# Patient Record
Sex: FEMALE | Race: WHITE | NOT HISPANIC OR LATINO | Employment: OTHER | URBAN - METROPOLITAN AREA
[De-identification: names, ages, dates, MRNs, and addresses within clinical notes are randomized per-mention and may not be internally consistent; named-entity substitution may affect disease eponyms.]

---

## 2022-05-28 RX ORDER — CARVEDILOL 6.25 MG/1
6.25 TABLET ORAL 2 TIMES DAILY
COMMUNITY
Start: 2021-09-30 | End: 2023-12-07

## 2022-05-28 RX ORDER — CALCIUM CARBONATE/VITAMIN D3 600MG-5MCG
1 TABLET ORAL DAILY
COMMUNITY
End: 2023-11-06 | Stop reason: ALTCHOICE

## 2022-05-28 RX ORDER — ATORVASTATIN CALCIUM 20 MG/1
20 TABLET, FILM COATED ORAL DAILY
COMMUNITY

## 2022-05-28 RX ORDER — POTASSIUM CHLORIDE 750 MG/1
10 TABLET, EXTENDED RELEASE ORAL 2 TIMES DAILY
COMMUNITY
Start: 2021-09-08 | End: 2023-11-06 | Stop reason: ALTCHOICE

## 2022-05-28 RX ORDER — NITROGLYCERIN 0.4 MG/1
0.4 TABLET SUBLINGUAL EVERY 5 MIN PRN
COMMUNITY
Start: 2021-07-12

## 2022-05-28 RX ORDER — METAXALONE 400 MG/1
1 TABLET ORAL 2 TIMES DAILY
COMMUNITY

## 2022-05-28 RX ORDER — FUROSEMIDE 20 MG/1
20 TABLET ORAL
COMMUNITY
Start: 2021-09-08

## 2022-05-28 RX ORDER — ACETAMINOPHEN 500 MG
5000 TABLET ORAL DAILY
COMMUNITY
End: 2023-11-06 | Stop reason: ALTCHOICE

## 2022-05-28 RX ORDER — HYDRALAZINE HYDROCHLORIDE 50 MG/1
50 TABLET, FILM COATED ORAL 2 TIMES DAILY
COMMUNITY

## 2022-05-28 RX ORDER — AMLODIPINE BESYLATE 5 MG/1
5 TABLET ORAL DAILY
COMMUNITY
Start: 2022-04-14 | End: 2023-12-07

## 2022-05-28 RX ORDER — SERTRALINE HYDROCHLORIDE 25 MG/1
25 TABLET, FILM COATED ORAL
COMMUNITY

## 2022-05-28 RX ORDER — TRAMADOL HYDROCHLORIDE 50 MG/1
50 TABLET ORAL EVERY 6 HOURS PRN
COMMUNITY

## 2022-05-28 RX ORDER — RANOLAZINE 500 MG/1
500 TABLET, EXTENDED RELEASE ORAL 2 TIMES DAILY
COMMUNITY
Start: 2022-04-14

## 2022-05-28 RX ORDER — VALSARTAN 160 MG/1
160 TABLET ORAL 2 TIMES DAILY
COMMUNITY

## 2022-05-28 RX ORDER — FERROUS GLUCONATE 324(38)MG
324 TABLET ORAL DAILY
COMMUNITY
Start: 2021-08-23 | End: 2022-06-01

## 2022-05-28 RX ORDER — ASPIRIN 81 MG/1
81 TABLET ORAL DAILY
COMMUNITY

## 2022-05-28 RX ORDER — METFORMIN HYDROCHLORIDE 1000 MG/1
1000 TABLET ORAL 2 TIMES DAILY WITH MEALS
COMMUNITY

## 2022-05-28 RX ORDER — CLOPIDOGREL BISULFATE 75 MG/1
75 TABLET ORAL DAILY
COMMUNITY

## 2022-05-28 RX ORDER — ONDANSETRON 4 MG/1
4 TABLET, FILM COATED ORAL EVERY 8 HOURS PRN
COMMUNITY

## 2022-06-01 ENCOUNTER — OFFICE VISIT (OUTPATIENT)
Dept: NEPHROLOGY | Facility: CLINIC | Age: 82
End: 2022-06-01
Payer: MEDICARE

## 2022-06-01 VITALS
HEART RATE: 66 BPM | HEIGHT: 66 IN | TEMPERATURE: 98 F | BODY MASS INDEX: 20.54 KG/M2 | OXYGEN SATURATION: 98 % | SYSTOLIC BLOOD PRESSURE: 129 MMHG | WEIGHT: 127.81 LBS | DIASTOLIC BLOOD PRESSURE: 49 MMHG

## 2022-06-01 DIAGNOSIS — I10 PRIMARY HYPERTENSION: ICD-10-CM

## 2022-06-01 DIAGNOSIS — E87.1 HYPONATREMIA: ICD-10-CM

## 2022-06-01 DIAGNOSIS — E11.22 TYPE 2 DIABETES MELLITUS WITH STAGE 3 CHRONIC KIDNEY DISEASE, UNSPECIFIED WHETHER LONG TERM INSULIN USE, UNSPECIFIED WHETHER STAGE 3A OR 3B CKD: ICD-10-CM

## 2022-06-01 DIAGNOSIS — N18.30 STAGE 3 CHRONIC KIDNEY DISEASE, UNSPECIFIED WHETHER STAGE 3A OR 3B CKD: Primary | ICD-10-CM

## 2022-06-01 DIAGNOSIS — N18.30 TYPE 2 DIABETES MELLITUS WITH STAGE 3 CHRONIC KIDNEY DISEASE, UNSPECIFIED WHETHER LONG TERM INSULIN USE, UNSPECIFIED WHETHER STAGE 3A OR 3B CKD: ICD-10-CM

## 2022-06-01 PROCEDURE — 99213 OFFICE O/P EST LOW 20 MIN: CPT | Mod: ,,, | Performed by: INTERNAL MEDICINE

## 2022-06-01 PROCEDURE — 99213 PR OFFICE/OUTPT VISIT, EST, LEVL III, 20-29 MIN: ICD-10-PCS | Mod: ,,, | Performed by: INTERNAL MEDICINE

## 2022-06-01 NOTE — PROGRESS NOTES
Nephrology Clinic Note           Pt Name:  Shaunna Franklin  Pt :  1940  Pt MRN:  [id number]    Date: 2022  Provider: Modesto Harmon      Chief Complaint: No chief complaint on file.      HPI:  Shaunna Franklin is a 82 y.o. 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  History is significant for chf, hyponatremia, htn . The patient is coming for follow up.  Since her last visit no new medical events. Intermittently takes Lasix intermittently  Hyponatremia has been for a long time - no new issues,stable at present  Today in the office - No shortness of breath, no chest pain, no fever, no dysuria, no hematuria,  Reports good control of her BS at home.  Bp well control.     Review of Systems         Review of Systems   Gen: no fever, chills, fatigue, weight loss/gain  HEENT: no vision changes, no hearing deficits, no nasal discharge  Respiratory: no cough, dyspnea  CVS: no chest pain, PND, orthopnea  Abd: no nausea, vomiting, abdominal pain, diarrhea, constipation  : no hematuria, dysuria, urinary retention  Ext: no edema, joint and muscle pains  Neuro: no weakness, no numbness  Skin: no rashes, no lesions  Psych: no depression, no anxiety    History:   Past Medical History:   Diagnosis Date    Aortic valve sclerosis     CAD (coronary artery disease) 2012    stent RCA    Cancer     skin cancer    CKD (chronic kidney disease)     Diabetes mellitus     Essential (primary) hypertension     Gastritis     Internal hemorrhoid     Mixed hyperlipidemia     Unspecified osteoarthritis, unspecified site      Past Surgical History:   Procedure Laterality Date    ABDOMINAL SURGERY      ABDOMINOPLASTY      CARDIAC CATHETERIZATION      CATARACT EXTRACTION Bilateral     COLONOSCOPY      COSMETIC SURGERY      bilateral under arm fat removal    ESOPHAGOGASTRODUODENOSCOPY      EYE SURGERY      FRACTURE SURGERY      HIP SURGERY      right sided - pinning and screws with rods    HYSTERECTOMY       ROTATOR CUFF REPAIR Bilateral     open repair    TUMOR REMOVAL      stomach    VARICOSE VEIN SURGERY       Family History   Problem Relation Age of Onset    Hypertension Mother     Heart disease Father     Cancer Sister     Diabetes Mellitus Maternal Grandmother     Heart disease Son      Social History     Substance and Sexual Activity   Alcohol Use Not on file     Social History     Substance and Sexual Activity   Drug Use Not on file     Social History     Substance and Sexual Activity   Sexual Activity Not on file     has no history on file for sexual activity.  Social History     Tobacco Use   Smoking Status Not on file   Smokeless Tobacco Not on file       Allergies:  Review of patient's allergies indicates:   Allergen Reactions    Opioids - morphine analogues        [unfilled]       Physical Exam  Vitals:   There were no vitals filed for this visit.  There is no height or weight on file to calculate BMI.    Vital signs:   Wt Readings from Last 3 Encounters:   No data found for Wt     Temp Readings from Last 3 Encounters:   No data found for Temp     BP Readings from Last 3 Encounters:   No data found for BP     Pulse Readings from Last 3 Encounters:   No data found for Pulse       @FLOWSTAT(5:24::1)@    GENERAL ASSESSMENT: awake and alert in no acute distress.  Eyes: anicteric sclera, no erythema or discharge.  HENT: Normocephalic, atraumatic, no mucus membranes  Neck: Supple, no thyromegaly or lymphadenopathy   SKIN EXAM: no rash, ecchymosis.  Cardiovascular: regular rate and rhythm, S1 S2 heard. no murmurs, rubs or gallops.  Respiratory: no  rales, no wheezes or rhonchi  GI: BS+, NT, ND, no organomegaly.  : no gonzalez   MSK: 3+  edema. No joint stiffness, effusions  NEURO: alert and oriented,  PSYCH: answers questions appropriately, organized thinking   Nails: no clubbing,  "no pallor      Labs/Tests:  @2YRLABRCNT(Na:3,K:3,CL:3,CO2:3,BUN:3,CREATININE:3,GLUCOSE:3,CALCIUM:3,PHOS:3,ALBUMIN:3,ANIONGAP:3,EGFRNONAA:3,EGFRAA:3,HGB:3,HGBA1C:3,TRANSFERRIN:3,IRON:3,TIBC:3,LABIRON:3,FERRITIN:3,TRIG:3,CHOL:3,HDL:3,LDLCALC:3,LABVLDL:3,NHDLCHOL:3)@   @2YRLABRCNT(pthi:*,[id number]:*)@  @2YRLABRCNT(PROCREA:3,PROTUR:3,CREATININEUR:3)@      @2YRLABRCNT(COLORUR:*,CLARITYUR:*,SPECGRAVUR:*,PHUR:*,PROTUR:*,BILIRUBINUR:*,BLOODUR:*,KETONESUR:*,UROB:*,NITRITEUR:*,LEUKOCYTESUR:*,HYCST:*,WBCUAMN:*,RBCUAMN:*,EPIUAMN:*,BACTM:*)@    "@2YRLABRCNT(URICACID:*)@          Renal US Renal US noted, symmetric kidneys, no hydro, minimal urine retention :  Impression:    Assessment & Plan:      Visit Diagnosis  No diagnosis found.      Plan    1. Hyponatremia - resolved   2. CKD stage  3 stable renal function -risk factors discussed at length with the patient , BS trending up -  we will continue to follow up in 6 months   3. Hypertension - well compensated, continue with the current regime - Lasix intermittently, on valsartan, on Hydralazine, on coreg.  4. DM type 2 - on metfromin , last BS - better control, ai1c 5.7, talked to her about it.   5. Anemia - has a schedule colonoscopy        Orders this visit   No orders of the defined types were placed in this encounter.         Follow Up:   No follow-ups on file.    Modesto Harmon M.D.  Nephrology  06/01/2022  3:07 PM          "

## 2022-12-15 DIAGNOSIS — N18.30 STAGE 3 CHRONIC KIDNEY DISEASE, UNSPECIFIED WHETHER STAGE 3A OR 3B CKD: Primary | ICD-10-CM

## 2022-12-15 DIAGNOSIS — E87.1 HYPONATREMIA: ICD-10-CM

## 2023-05-02 ENCOUNTER — OFFICE VISIT (OUTPATIENT)
Dept: NEPHROLOGY | Facility: CLINIC | Age: 83
End: 2023-05-02
Payer: MEDICARE

## 2023-05-02 VITALS
HEIGHT: 67 IN | DIASTOLIC BLOOD PRESSURE: 68 MMHG | BODY MASS INDEX: 19.18 KG/M2 | WEIGHT: 122.19 LBS | SYSTOLIC BLOOD PRESSURE: 151 MMHG | OXYGEN SATURATION: 98 % | HEART RATE: 68 BPM

## 2023-05-02 DIAGNOSIS — I10 PRIMARY HYPERTENSION: ICD-10-CM

## 2023-05-02 DIAGNOSIS — N18.32 TYPE 2 DIABETES MELLITUS WITH STAGE 3B CHRONIC KIDNEY DISEASE, UNSPECIFIED WHETHER LONG TERM INSULIN USE: ICD-10-CM

## 2023-05-02 DIAGNOSIS — N18.32 STAGE 3B CHRONIC KIDNEY DISEASE: Primary | ICD-10-CM

## 2023-05-02 DIAGNOSIS — E11.22 TYPE 2 DIABETES MELLITUS WITH STAGE 3B CHRONIC KIDNEY DISEASE, UNSPECIFIED WHETHER LONG TERM INSULIN USE: ICD-10-CM

## 2023-05-02 DIAGNOSIS — E87.1 HYPONATREMIA: ICD-10-CM

## 2023-05-02 PROCEDURE — 99214 OFFICE O/P EST MOD 30 MIN: CPT | Mod: S$GLB,,, | Performed by: INTERNAL MEDICINE

## 2023-05-02 PROCEDURE — 99214 PR OFFICE/OUTPT VISIT, EST, LEVL IV, 30-39 MIN: ICD-10-PCS | Mod: S$GLB,,, | Performed by: INTERNAL MEDICINE

## 2023-05-02 RX ORDER — FLUTICASONE PROPIONATE 50 MCG
2 SPRAY, SUSPENSION (ML) NASAL
COMMUNITY

## 2023-05-02 RX ORDER — BRIMONIDINE TARTRATE 1.5 MG/ML
1 SOLUTION/ DROPS OPHTHALMIC
COMMUNITY
Start: 2023-04-19

## 2023-05-02 RX ORDER — NEOMYCIN SULFATE, POLYMYXIN B SULFATE AND DEXAMETHASONE 3.5; 10000; 1 MG/ML; [USP'U]/ML; MG/ML
SUSPENSION/ DROPS OPHTHALMIC
COMMUNITY
Start: 2022-12-30 | End: 2023-12-07

## 2023-05-02 RX ORDER — INDOMETHACIN 25 MG/1
CAPSULE ORAL
COMMUNITY
Start: 2023-02-09 | End: 2023-05-02

## 2023-05-02 RX ORDER — DENOSUMAB 60 MG/ML
INJECTION SUBCUTANEOUS
COMMUNITY
Start: 2023-04-04

## 2023-05-02 RX ORDER — CETIRIZINE HYDROCHLORIDE 10 MG/1
1 TABLET ORAL DAILY
COMMUNITY
Start: 2022-12-13

## 2023-05-02 RX ORDER — DORZOLAMIDE HYDROCHLORIDE AND TIMOLOL MALEATE 20; 5 MG/ML; MG/ML
1 SOLUTION/ DROPS OPHTHALMIC 2 TIMES DAILY
COMMUNITY

## 2023-05-02 NOTE — PROGRESS NOTES
Nephrology Clinic Note           Pt Name:  Shaunna Franklin  Pt :  1940  Pt MRN:  [id number]    Date: 2023  Provider: Modesto Harmon      Chief Complaint: No chief complaint on file.      HPI:  Shaunna Franklin is a 82 y.o. male  presenting for hyponatremia, ckd 2 to 3.  History is significant for chf, hyponatremia, htn, s/p left renal artery stent placement. The patient is coming for follow up.  Since her last visit no new medical events. Underwent a cardiac cath in 10/2022. Intermittently takes Lasix intermittently  Hyponatremia has been for a long time - no new issues,stable at present  Today in the office - No shortness of breath, no chest pain, no fever, no dysuria, no hematuria,  Reports good control of her BS at home.  Bp well control.     Review of Systems         Review of Systems   Gen: no fever, chills, fatigue, weight loss/gain  HEENT: no vision changes, no hearing deficits, no nasal discharge  Respiratory: no cough, dyspnea  CVS: no chest pain, PND, orthopnea  Abd: no nausea, vomiting, abdominal pain, diarrhea, constipation  : no hematuria, dysuria, urinary retention  Ext: no edema, joint and muscle pains  Neuro: no weakness, no numbness  Skin: no rashes, no lesions  Psych: no depression, no anxiety    History:   Past Medical History:   Diagnosis Date    Aortic valve sclerosis     CAD (coronary artery disease) 2012    stent RCA    Cancer     skin cancer    CKD (chronic kidney disease)     Diabetes mellitus     Essential (primary) hypertension     Gastritis     Internal hemorrhoid     Mixed hyperlipidemia     Unspecified osteoarthritis, unspecified site      Past Surgical History:   Procedure Laterality Date    ABDOMINAL SURGERY      ABDOMINOPLASTY      CARDIAC CATHETERIZATION      CATARACT EXTRACTION Bilateral     COLONOSCOPY      COSMETIC SURGERY      bilateral under arm fat removal    ESOPHAGOGASTRODUODENOSCOPY      EYE SURGERY      FRACTURE SURGERY      HIP SURGERY      right  sided - pinning and screws with rods    HYSTERECTOMY      ROTATOR CUFF REPAIR Bilateral     open repair    TUMOR REMOVAL      stomach    VARICOSE VEIN SURGERY       Family History   Problem Relation Age of Onset    Hypertension Mother     Heart disease Father     Cancer Sister     Diabetes Mellitus Maternal Grandmother     Heart disease Son      Social History     Substance and Sexual Activity   Alcohol Use Yes    Comment: very occational     Social History     Substance and Sexual Activity   Drug Use Not on file     Social History     Substance and Sexual Activity   Sexual Activity Not on file     has no history on file for sexual activity.  Social History     Tobacco Use   Smoking Status Never   Smokeless Tobacco Never       Allergies:  Review of patient's allergies indicates:   Allergen Reactions    Clonidine      Other reaction(s): Dizziness    Gabapentin      Other reaction(s): Confusion    Opioids - morphine analogues     Morphine      Other reaction(s): Other (See Comments)  Makes her feel funny       [unfilled]       Physical Exam  Vitals:   There were no vitals filed for this visit.  There is no height or weight on file to calculate BMI.    Vital signs:   Wt Readings from Last 3 Encounters:   06/01/22 58 kg (127 lb 12.8 oz)     Temp Readings from Last 3 Encounters:   06/01/22 98.3 °F (36.8 °C)     BP Readings from Last 3 Encounters:   06/01/22 (!) 129/49     Pulse Readings from Last 3 Encounters:   06/01/22 66       @FLOWSTAT(5:24::1)@    GENERAL ASSESSMENT: awake and alert in no acute distress.  Eyes: anicteric sclera, no erythema or discharge.  HENT: Normocephalic, atraumatic, no mucus membranes  Neck: Supple, no thyromegaly or lymphadenopathy   SKIN EXAM: no rash, ecchymosis.  Cardiovascular: regular rate and rhythm, S1 S2 heard. no murmurs, rubs or gallops.  Respiratory: no  rales, no wheezes or rhonchi  GI: BS+, NT, ND, no organomegaly.  : no gonzalez   MSK: 3+  edema. No joint stiffness,  "effusions  NEURO: alert and oriented,  PSYCH: answers questions appropriately, organized thinking   Nails: no clubbing, no pallor      Labs/Tests:  @2YRLABRCNT(Na:3,K:3,CL:3,CO2:3,BUN:3,CREATININE:3,GLUCOSE:3,CALCIUM:3,PHOS:3,ALBUMIN:3,ANIONGAP:3,EGFRNONAA:3,EGFRAA:3,HGB:3,HGBA1C:3,TRANSFERRIN:3,IRON:3,TIBC:3,LABIRON:3,FERRITIN:3,TRIG:3,CHOL:3,HDL:3,LDLCALC:3,LABVLDL:3,NHDLCHOL:3)@   @2YRLABRCNT(pthi:*,[id number]:*)@  @2YRLABRCNT(PROCREA:3,PROTUR:3,CREATININEUR:3)@      @2YRLABRCNT(COLORUR:*,CLARITYUR:*,SPECGRAVUR:*,PHUR:*,PROTUR:*,BILIRUBINUR:*,BLOODUR:*,KETONESUR:*,UROB:*,NITRITEUR:*,LEUKOCYTESUR:*,HYCST:*,WBCUAMN:*,RBCUAMN:*,EPIUAMN:*,BACTM:*)@    "@2YRLABRCNT(URICACID:*)@                          Renal US Renal US noted, symmetric kidneys, no hydro, minimal urine retention :  Impression:    Assessment & Plan:      Visit Diagnosis  No diagnosis found.      Plan    Hyponatremia - resolved   CKD stage  3 stable renal function -risk factors discussed at length with the patient , BS trending up -  we will continue to follow up in 6 months   Hypertension - well compensated, continue with the current regime - Lasix intermittently, on valsartan, on Hydralazine, on coreg.  DM type 2 - on metfromin  Anemia - stable  S/p left renal artery stent placement back in the days        Orders this visit   No orders of the defined types were placed in this encounter.         Follow Up:   No follow-ups on file.    Modesto Harmon M.D.  Nephrology  05/02/2023  3:07 PM            "

## 2023-11-06 ENCOUNTER — OFFICE VISIT (OUTPATIENT)
Dept: NEPHROLOGY | Facility: CLINIC | Age: 83
End: 2023-11-06
Payer: MEDICARE

## 2023-11-06 VITALS
SYSTOLIC BLOOD PRESSURE: 135 MMHG | WEIGHT: 122 LBS | HEIGHT: 67 IN | OXYGEN SATURATION: 97 % | DIASTOLIC BLOOD PRESSURE: 52 MMHG | BODY MASS INDEX: 19.15 KG/M2 | HEART RATE: 72 BPM

## 2023-11-06 DIAGNOSIS — D63.1 ANEMIA IN STAGE 3 CHRONIC KIDNEY DISEASE, UNSPECIFIED WHETHER STAGE 3A OR 3B CKD: ICD-10-CM

## 2023-11-06 DIAGNOSIS — N18.30 ANEMIA IN STAGE 3 CHRONIC KIDNEY DISEASE, UNSPECIFIED WHETHER STAGE 3A OR 3B CKD: ICD-10-CM

## 2023-11-06 DIAGNOSIS — N18.30 STAGE 3 CHRONIC KIDNEY DISEASE, UNSPECIFIED WHETHER STAGE 3A OR 3B CKD: Primary | ICD-10-CM

## 2023-11-06 PROCEDURE — 99213 PR OFFICE/OUTPT VISIT, EST, LEVL III, 20-29 MIN: ICD-10-PCS | Mod: S$GLB,,, | Performed by: INTERNAL MEDICINE

## 2023-11-06 PROCEDURE — 99213 OFFICE O/P EST LOW 20 MIN: CPT | Mod: S$GLB,,, | Performed by: INTERNAL MEDICINE

## 2023-11-06 NOTE — PROGRESS NOTES
Nephrology Clinic Note           Pt Name:  Shaunna Franklin  Pt :  1940  Pt MRN:  [id number]    Date: 2023  Provider: Modesto Harmon      Chief Complaint: No chief complaint on file.      HPI:  Shaunna Franklin is a 83 y.o. male  presenting for hyponatremia, ckd  3.  History is significant for chf, hyponatremia, htn, s/p left renal artery stent placement. The patient is coming for follow up.Hyponatremia has been for a long time - no new issues,stable at present  Since her last visit no new medical events. Still takes lasix intermittently. Reports some tiredeness and weakness  Today in the office - No shortness of breath, no chest pain, no fever, no dysuria, no hematuria,  Reports good control of her BS at home.  Bp well control.     Review of Systems         Review of Systems   Gen: no fever, chills, fatigue, weight loss/gain  HEENT: no vision changes, no hearing deficits, no nasal discharge  Respiratory: no cough, dyspnea  CVS: no chest pain, PND, orthopnea  Abd: no nausea, vomiting, abdominal pain, diarrhea, constipation  : no hematuria, dysuria, urinary retention  Ext: no edema, joint and muscle pains  Neuro: no weakness, no numbness  Skin: no rashes, no lesions  Psych: no depression, no anxiety    History:   Past Medical History:   Diagnosis Date    Aortic valve sclerosis     CAD (coronary artery disease) 2012    stent RCA    Cancer     skin cancer    CKD (chronic kidney disease)     Diabetes mellitus     Essential (primary) hypertension     Gastritis     Internal hemorrhoid     Mixed hyperlipidemia     Unspecified osteoarthritis, unspecified site      Past Surgical History:   Procedure Laterality Date    ABDOMINAL SURGERY      ABDOMINOPLASTY      CARDIAC CATHETERIZATION      CATARACT EXTRACTION Bilateral     COLONOSCOPY      COSMETIC SURGERY      bilateral under arm fat removal    ESOPHAGOGASTRODUODENOSCOPY      EYE SURGERY      FRACTURE SURGERY      HIP SURGERY      right sided -  pinning and screws with rods    HYSTERECTOMY      ROTATOR CUFF REPAIR Bilateral     open repair    TUMOR REMOVAL      stomach    VARICOSE VEIN SURGERY       Family History   Problem Relation Age of Onset    Hypertension Mother     Heart disease Father     Cancer Sister     Diabetes Mellitus Maternal Grandmother     Heart disease Son      Social History     Substance and Sexual Activity   Alcohol Use Yes    Comment: very occational     Social History     Substance and Sexual Activity   Drug Use Not on file     Social History     Substance and Sexual Activity   Sexual Activity Not on file     has no history on file for sexual activity.  Social History     Tobacco Use   Smoking Status Never   Smokeless Tobacco Never       Allergies:  Review of patient's allergies indicates:   Allergen Reactions    Clonidine      Other reaction(s): Dizziness    Gabapentin      Other reaction(s): Confusion    Opioids - morphine analogues     Morphine      Other reaction(s): Other (See Comments)  Makes her feel funny       [unfilled]       Physical Exam  Vitals:   There were no vitals filed for this visit.  There is no height or weight on file to calculate BMI.    Vital signs:   Wt Readings from Last 3 Encounters:   05/02/23 55.4 kg (122 lb 3.2 oz)   06/01/22 58 kg (127 lb 12.8 oz)     Temp Readings from Last 3 Encounters:   06/01/22 98.3 °F (36.8 °C)     BP Readings from Last 3 Encounters:   05/02/23 (!) 151/68   06/01/22 (!) 129/49     Pulse Readings from Last 3 Encounters:   05/02/23 68   06/01/22 66       @FLOWSTAT(5:24::1)@    GENERAL ASSESSMENT: awake and alert in no acute distress.  Eyes: anicteric sclera, no erythema or discharge.  HENT: Normocephalic, atraumatic, no mucus membranes  Neck: Supple, no thyromegaly or lymphadenopathy   SKIN EXAM: no rash, ecchymosis.  Cardiovascular: regular rate and rhythm, S1 S2 heard. no murmurs, rubs or gallops.  Respiratory: no  rales, no wheezes or rhonchi  GI: BS+, NT, ND, no organomegaly.  :  "no gonzalez   MSK: 3+  edema. No joint stiffness, effusions  NEURO: alert and oriented,  PSYCH: answers questions appropriately, organized thinking   Nails: no clubbing, no pallor      Labs/Tests:  @2YRLABRCNT(Na:3,K:3,CL:3,CO2:3,BUN:3,CREATININE:3,GLUCOSE:3,CALCIUM:3,PHOS:3,ALBUMIN:3,ANIONGAP:3,EGFRNONAA:3,EGFRAA:3,HGB:3,HGBA1C:3,TRANSFERRIN:3,IRON:3,TIBC:3,LABIRON:3,FERRITIN:3,TRIG:3,CHOL:3,HDL:3,LDLCALC:3,LABVLDL:3,NHDLCHOL:3)@   @2YRLABRCNT(pthi:*,[id number]:*)@  @2YRLABRCNT(PROCREA:3,PROTUR:3,CREATININEUR:3)@      @2YRLABRCNT(COLORUR:*,CLARITYUR:*,SPECGRAVUR:*,PHUR:*,PROTUR:*,BILIRUBINUR:*,BLOODUR:*,KETONESUR:*,UROB:*,NITRITEUR:*,LEUKOCYTESUR:*,HYCST:*,WBCUAMN:*,RBCUAMN:*,EPIUAMN:*,BACTM:*)@    "@2YRLABRCNT(URICACID:*)@                                                Renal US Renal US noted, symmetric kidneys, no hydro, minimal urine retention :  Impression:    Assessment & Plan:      Visit Diagnosis  No diagnosis found.      Plan    Hyponatremia - resolved, stable  CKD stage  3 stable renal function -risk factors discussed at length with the patient , BS trending up -  we will continue to follow up in 1 months   Hypertension - well compensated, continue with the current regime - Lasix intermittently, on valsartan, on Hydralazine, on coreg.  DM type 2 - on metfromin  Anemia -  trending down, will check iron level - follow up in 1 month. To follow up with her GI for possible colonoscopy.   S/p left renal artery stent placement back in the days        Orders this visit   No orders of the defined types were placed in this encounter.         Follow Up:   No follow-ups on file.    Modesto Harmon M.D.  Nephrology  11/06/2023  3:07 PM            "

## 2023-12-07 ENCOUNTER — OFFICE VISIT (OUTPATIENT)
Dept: NEPHROLOGY | Facility: CLINIC | Age: 83
End: 2023-12-07
Payer: MEDICARE

## 2023-12-07 VITALS
OXYGEN SATURATION: 98 % | HEART RATE: 65 BPM | WEIGHT: 120 LBS | SYSTOLIC BLOOD PRESSURE: 121 MMHG | BODY MASS INDEX: 18.83 KG/M2 | DIASTOLIC BLOOD PRESSURE: 54 MMHG | HEIGHT: 67 IN

## 2023-12-07 DIAGNOSIS — N18.30 STAGE 3 CHRONIC KIDNEY DISEASE, UNSPECIFIED WHETHER STAGE 3A OR 3B CKD: ICD-10-CM

## 2023-12-07 DIAGNOSIS — D63.1 ANEMIA IN STAGE 3 CHRONIC KIDNEY DISEASE, UNSPECIFIED WHETHER STAGE 3A OR 3B CKD: Primary | ICD-10-CM

## 2023-12-07 DIAGNOSIS — N18.30 ANEMIA IN STAGE 3 CHRONIC KIDNEY DISEASE, UNSPECIFIED WHETHER STAGE 3A OR 3B CKD: Primary | ICD-10-CM

## 2023-12-07 PROCEDURE — 99213 PR OFFICE/OUTPT VISIT, EST, LEVL III, 20-29 MIN: ICD-10-PCS | Mod: S$GLB,,, | Performed by: INTERNAL MEDICINE

## 2023-12-07 PROCEDURE — 99213 OFFICE O/P EST LOW 20 MIN: CPT | Mod: S$GLB,,, | Performed by: INTERNAL MEDICINE

## 2023-12-07 RX ORDER — SODIUM CHLORIDE 0.9 % (FLUSH) 0.9 %
10 SYRINGE (ML) INJECTION
OUTPATIENT
Start: 2023-12-28

## 2023-12-07 RX ORDER — DIPHENHYDRAMINE HYDROCHLORIDE 50 MG/ML
50 INJECTION INTRAMUSCULAR; INTRAVENOUS ONCE AS NEEDED
OUTPATIENT
Start: 2023-12-28

## 2023-12-07 RX ORDER — SODIUM CHLORIDE 9 MG/ML
INJECTION, SOLUTION INTRAVENOUS CONTINUOUS
OUTPATIENT
Start: 2023-12-28

## 2023-12-07 RX ORDER — EPINEPHRINE 0.3 MG/.3ML
0.3 INJECTION SUBCUTANEOUS ONCE AS NEEDED
OUTPATIENT
Start: 2023-12-28

## 2023-12-07 RX ORDER — HEPARIN 100 UNIT/ML
5 SYRINGE INTRAVENOUS
OUTPATIENT
Start: 2023-12-28

## 2023-12-07 NOTE — PROGRESS NOTES
Nephrology Clinic Note           Pt Name:  Shaunna Franklin  Pt :  1940  Pt MRN:  [id number]    Date: 2023  Provider: Modesto Harmon      Chief Complaint:   Chief Complaint   Patient presents with    Chronic Kidney Disease     Cre 1.27, Gfr 42, Ckd stage 3B       HPI:  Shaunna Franklin is a 83 y.o. male  presenting for hyponatremia, ckd  3.  History is significant for chf, hyponatremia, htn, s/p left renal artery stent placement. The patient is coming for follow up.Hyponatremia has been for a long time - no new issues,stable at present  Since her last visit no new medical events.   Today in the office - No shortness of breath, no chest pain, no fever, no dysuria, no hematuria,  Good bp at home  Reports good control of her BS at home.  Bp well control.     Review of Systems         Review of Systems   Gen: no fever, chills, fatigue, weight loss/gain  HEENT: no vision changes, no hearing deficits, no nasal discharge  Respiratory: no cough, dyspnea  CVS: no chest pain, PND, orthopnea  Abd: no nausea, vomiting, abdominal pain, diarrhea, constipation  : no hematuria, dysuria, urinary retention  Ext: no edema, joint and muscle pains  Neuro: no weakness, no numbness  Skin: no rashes, no lesions  Psych: no depression, no anxiety    History:   Past Medical History:   Diagnosis Date    Aortic valve sclerosis     CAD (coronary artery disease) 2012    stent RCA    Cancer     skin cancer    CKD (chronic kidney disease)     Diabetes mellitus     Essential (primary) hypertension     Gastritis     Internal hemorrhoid     Mixed hyperlipidemia     Unspecified osteoarthritis, unspecified site      Past Surgical History:   Procedure Laterality Date    ABDOMINAL SURGERY      ABDOMINOPLASTY      CARDIAC CATHETERIZATION      CATARACT EXTRACTION Bilateral     COLONOSCOPY      COSMETIC SURGERY      bilateral under arm fat removal    ESOPHAGOGASTRODUODENOSCOPY      EYE SURGERY      FRACTURE SURGERY      HIP SURGERY       right sided - pinning and screws with rods    HYSTERECTOMY      ROTATOR CUFF REPAIR Bilateral     open repair    TUMOR REMOVAL      stomach    VARICOSE VEIN SURGERY       Family History   Problem Relation Age of Onset    Hypertension Mother     Heart disease Father     Cancer Sister     Diabetes Mellitus Maternal Grandmother     Heart disease Son      Social History     Substance and Sexual Activity   Alcohol Use Yes    Comment: very occational     Social History     Substance and Sexual Activity   Drug Use Not Currently     Social History     Substance and Sexual Activity   Sexual Activity Not Currently     reports that she is not currently sexually active.  Social History     Tobacco Use   Smoking Status Never   Smokeless Tobacco Never       Allergies:  Review of patient's allergies indicates:   Allergen Reactions    Clonidine      Other reaction(s): Dizziness    Gabapentin      Other reaction(s): Confusion    Opioids - morphine analogues     Morphine      Other reaction(s): Other (See Comments)  Makes her feel funny       [unfilled]       Physical Exam  Vitals:   There were no vitals filed for this visit.  There is no height or weight on file to calculate BMI.    Vital signs:   Wt Readings from Last 3 Encounters:   11/06/23 55.3 kg (122 lb)   05/02/23 55.4 kg (122 lb 3.2 oz)   06/01/22 58 kg (127 lb 12.8 oz)     Temp Readings from Last 3 Encounters:   06/01/22 98.3 °F (36.8 °C)     BP Readings from Last 3 Encounters:   11/06/23 (!) 135/52   05/02/23 (!) 151/68   06/01/22 (!) 129/49     Pulse Readings from Last 3 Encounters:   11/06/23 72   05/02/23 68   06/01/22 66       @FLOWSTAT(5:24::1)@    GENERAL ASSESSMENT: awake and alert in no acute distress.  Eyes: anicteric sclera, no erythema or discharge.  HENT: Normocephalic, atraumatic, no mucus membranes  Neck: Supple, no thyromegaly or lymphadenopathy   SKIN EXAM: no rash, ecchymosis.  Cardiovascular: regular rate and rhythm, S1 S2 heard. no murmurs, rubs or  "gallops.  Respiratory: no  rales, no wheezes or rhonchi  GI: BS+, NT, ND, no organomegaly.  : no gonzalez   MSK: 3+  edema. No joint stiffness, effusions  NEURO: alert and oriented,  PSYCH: answers questions appropriately, organized thinking   Nails: no clubbing, no pallor      Labs/Tests:  @2YRLABRCNT(Na:3,K:3,CL:3,CO2:3,BUN:3,CREATININE:3,GLUCOSE:3,CALCIUM:3,PHOS:3,ALBUMIN:3,ANIONGAP:3,EGFRNONAA:3,EGFRAA:3,HGB:3,HGBA1C:3,TRANSFERRIN:3,IRON:3,TIBC:3,LABIRON:3,FERRITIN:3,TRIG:3,CHOL:3,HDL:3,LDLCALC:3,LABVLDL:3,NHDLCHOL:3)@   @2YRLABRCNT(pthi:*,[id number]:*)@  @2YRLABRCNT(PROCREA:3,PROTUR:3,CREATININEUR:3)@      @2YRLABRCNT(COLORUR:*,CLARITYUR:*,SPECGRAVUR:*,PHUR:*,PROTUR:*,BILIRUBINUR:*,BLOODUR:*,KETONESUR:*,UROB:*,NITRITEUR:*,LEUKOCYTESUR:*,HYCST:*,WBCUAMN:*,RBCUAMN:*,EPIUAMN:*,BACTM:*)@    "@2YRLABRCNT(URICACID:*)@                                                                Renal US Renal US noted, symmetric kidneys, no hydro, minimal urine retention :  Impression:    Assessment & Plan:      Visit Diagnosis  No diagnosis found.      Plan    Hyponatremia - resolved, stable  CKD stage  3 stable renal function -risk factors discussed at length with the patient , BS trending up -  we will continue to follow up in 1 months   Hypertension - well compensated, continue with the current regime - Lasix intermittently, on valsartan, on Hydralazine, on coreg.  DM type 2 - on metfromin  Anemia -  trending down, iron - levels noted, will refer for iv iron. To follow up in 3 months, may need epo also in the future. Diet discuss with the patient. To follow up with her GI for possible colonoscopy.   S/p left renal artery stent placement back in the days        Orders this visit   No orders of the defined types were placed in this encounter.         Follow Up:   No follow-ups on file.    Modesto Harmon M.D.  Nephrology  12/07/2023  3:07 PM            " Clear